# Patient Record
Sex: FEMALE | Race: WHITE | NOT HISPANIC OR LATINO | Employment: OTHER | URBAN - NONMETROPOLITAN AREA
[De-identification: names, ages, dates, MRNs, and addresses within clinical notes are randomized per-mention and may not be internally consistent; named-entity substitution may affect disease eponyms.]

---

## 2021-05-17 ENCOUNTER — IMMUNIZATION (OUTPATIENT)
Dept: FAMILY MEDICINE | Facility: CLINIC | Age: 71
End: 2021-05-17
Payer: MEDICARE

## 2021-05-17 DIAGNOSIS — Z23 NEED FOR VACCINATION: Primary | ICD-10-CM

## 2021-05-17 PROCEDURE — 91301 COVID-19, MRNA, LNP-S, PF, 100 MCG/0.5 ML DOSE VACCINE: CPT | Mod: ,,, | Performed by: FAMILY MEDICINE

## 2021-05-17 PROCEDURE — 99499 NO LOS: ICD-10-PCS | Mod: ,,, | Performed by: FAMILY MEDICINE

## 2021-05-17 PROCEDURE — 99499 UNLISTED E&M SERVICE: CPT | Mod: ,,, | Performed by: FAMILY MEDICINE

## 2021-05-17 PROCEDURE — 91301 COVID-19, MRNA, LNP-S, PF, 100 MCG/0.5 ML DOSE VACCINE: ICD-10-PCS | Mod: ,,, | Performed by: FAMILY MEDICINE

## 2021-05-17 PROCEDURE — 0012A COVID-19, MRNA, LNP-S, PF, 100 MCG/0.5 ML DOSE VACCINE: CPT | Mod: ,,, | Performed by: FAMILY MEDICINE

## 2021-05-17 PROCEDURE — 0012A COVID-19, MRNA, LNP-S, PF, 100 MCG/0.5 ML DOSE VACCINE: ICD-10-PCS | Mod: ,,, | Performed by: FAMILY MEDICINE

## 2021-10-18 ENCOUNTER — HOSPITAL ENCOUNTER (OUTPATIENT)
Dept: RADIOLOGY | Facility: HOSPITAL | Age: 71
Discharge: HOME | End: 2021-10-18
Attending: NURSE PRACTITIONER
Payer: MEDICARE

## 2021-10-18 DIAGNOSIS — R11.0 NAUSEA: ICD-10-CM

## 2021-10-18 DIAGNOSIS — R10.32 LLQ ABDOMINAL PAIN: ICD-10-CM

## 2021-10-18 DIAGNOSIS — R10.11 RUQ ABDOMINAL PAIN: ICD-10-CM

## 2021-10-18 LAB — CREAT SERPL-MCNC: 0.8 MG/DL (ref 0.6–1.3)

## 2021-10-18 PROCEDURE — 74177 CT ABDOMEN PELVIS WITH CONTRAST: ICD-10-PCS | Mod: 26,,, | Performed by: RADIOLOGY

## 2021-10-18 PROCEDURE — 82565 ASSAY OF CREATININE: CPT

## 2021-10-18 PROCEDURE — 74177 CT ABD & PELVIS W/CONTRAST: CPT | Mod: 26,,, | Performed by: RADIOLOGY

## 2021-10-18 PROCEDURE — 25500020 PHARM REV CODE 255

## 2021-10-18 PROCEDURE — 74177 CT ABD & PELVIS W/CONTRAST: CPT | Mod: TC

## 2021-10-18 RX ADMIN — IOPAMIDOL 100 ML: 755 INJECTION, SOLUTION INTRAVENOUS at 08:10

## 2022-01-10 ENCOUNTER — OFFICE VISIT (OUTPATIENT)
Dept: FAMILY MEDICINE | Facility: CLINIC | Age: 72
End: 2022-01-10
Payer: MEDICARE

## 2022-01-10 VITALS
RESPIRATION RATE: 20 BRPM | TEMPERATURE: 98 F | WEIGHT: 189 LBS | HEART RATE: 63 BPM | HEIGHT: 66 IN | DIASTOLIC BLOOD PRESSURE: 62 MMHG | OXYGEN SATURATION: 97 % | BODY MASS INDEX: 30.37 KG/M2 | SYSTOLIC BLOOD PRESSURE: 120 MMHG

## 2022-01-10 DIAGNOSIS — J06.9 UPPER RESPIRATORY TRACT INFECTION, UNSPECIFIED TYPE: Primary | ICD-10-CM

## 2022-01-10 PROCEDURE — 99212 OFFICE O/P EST SF 10 MIN: CPT | Mod: 25,,, | Performed by: NURSE PRACTITIONER

## 2022-01-10 PROCEDURE — 96372 THER/PROPH/DIAG INJ SC/IM: CPT | Mod: ,,, | Performed by: NURSE PRACTITIONER

## 2022-01-10 PROCEDURE — 99212 PR OFFICE/OUTPT VISIT, EST, LEVL II, 10-19 MIN: ICD-10-PCS | Mod: 25,,, | Performed by: NURSE PRACTITIONER

## 2022-01-10 PROCEDURE — 96372 PR INJECTION,THERAP/PROPH/DIAG2ST, IM OR SUBCUT: ICD-10-PCS | Mod: ,,, | Performed by: NURSE PRACTITIONER

## 2022-01-10 RX ORDER — PANTOPRAZOLE SODIUM 40 MG/1
1 TABLET, DELAYED RELEASE ORAL DAILY
COMMUNITY
Start: 2021-10-18 | End: 2023-02-07 | Stop reason: SDUPTHER

## 2022-01-10 RX ORDER — DOFETILIDE 0.25 MG/1
1 CAPSULE ORAL 2 TIMES DAILY
COMMUNITY
Start: 2021-12-10

## 2022-01-10 RX ORDER — EZETIMIBE 10 MG/1
1 TABLET ORAL DAILY
COMMUNITY
Start: 2021-10-14

## 2022-01-10 RX ORDER — METOPROLOL SUCCINATE 25 MG/1
1 TABLET, EXTENDED RELEASE ORAL DAILY
COMMUNITY
Start: 2021-10-14

## 2022-01-10 RX ORDER — RIVAROXABAN 20 MG/1
1 TABLET, FILM COATED ORAL DAILY
COMMUNITY
Start: 2021-11-03

## 2022-01-10 RX ORDER — AZITHROMYCIN 250 MG/1
TABLET, FILM COATED ORAL
Qty: 6 TABLET | Refills: 0 | Status: SHIPPED | OUTPATIENT
Start: 2022-01-10 | End: 2022-01-15

## 2022-01-10 RX ORDER — DEXAMETHASONE SODIUM PHOSPHATE 4 MG/ML
4 INJECTION, SOLUTION INTRA-ARTICULAR; INTRALESIONAL; INTRAMUSCULAR; INTRAVENOUS; SOFT TISSUE
Status: COMPLETED | OUTPATIENT
Start: 2022-01-10 | End: 2022-01-10

## 2022-01-10 RX ORDER — METHYLPREDNISOLONE ACETATE 40 MG/ML
40 INJECTION, SUSPENSION INTRA-ARTICULAR; INTRALESIONAL; INTRAMUSCULAR; SOFT TISSUE
Status: COMPLETED | OUTPATIENT
Start: 2022-01-10 | End: 2022-01-10

## 2022-01-10 RX ORDER — PAROXETINE HYDROCHLORIDE 20 MG/1
1 TABLET, FILM COATED ORAL DAILY
COMMUNITY
Start: 2021-10-15

## 2022-01-10 RX ORDER — CEFTRIAXONE 1 G/1
1 INJECTION, POWDER, FOR SOLUTION INTRAMUSCULAR; INTRAVENOUS
Status: COMPLETED | OUTPATIENT
Start: 2022-01-10 | End: 2022-01-10

## 2022-01-10 RX ADMIN — DEXAMETHASONE SODIUM PHOSPHATE 4 MG: 4 INJECTION, SOLUTION INTRA-ARTICULAR; INTRALESIONAL; INTRAMUSCULAR; INTRAVENOUS; SOFT TISSUE at 11:01

## 2022-01-10 RX ADMIN — CEFTRIAXONE 1 G: 1 INJECTION, POWDER, FOR SOLUTION INTRAMUSCULAR; INTRAVENOUS at 11:01

## 2022-01-10 RX ADMIN — METHYLPREDNISOLONE ACETATE 40 MG: 40 INJECTION, SUSPENSION INTRA-ARTICULAR; INTRALESIONAL; INTRAMUSCULAR; SOFT TISSUE at 11:01

## 2022-01-10 NOTE — PROGRESS NOTES
"New clinic note    Nataliya Zarco is a 71 y.o. female     Chief Complaint:   Chief Complaint   Patient presents with    Sinus Problem     Since Friday        Subjective:    Patient complains of sinus symptoms X 4 days. Admits to drainage and cough. Voice is hoarse today. Denies fever. Denies bodyaches.          Current Outpatient Medications:     dofetilide (TIKOSYN) 250 MCG Cap, Take 1 capsule by mouth 2 (two) times a day., Disp: , Rfl:     ezetimibe (ZETIA) 10 mg tablet, Take 1 tablet by mouth once daily., Disp: , Rfl:     metoprolol succinate (TOPROL-XL) 25 MG 24 hr tablet, Take 1 tablet by mouth once daily., Disp: , Rfl:     pantoprazole (PROTONIX) 40 MG tablet, Take 1 tablet by mouth once daily., Disp: , Rfl:     paroxetine (PAXIL) 20 MG tablet, Take 1 tablet by mouth once daily., Disp: , Rfl:     XARELTO 20 mg Tab, Take 1 tablet by mouth once daily., Disp: , Rfl:     azithromycin (Z-CHITRA) 250 MG tablet, Take 2 tablets by mouth on day 1; Take 1 tablet by mouth on days 2-5, Disp: 6 tablet, Rfl: 0    chlorpheniramine-phenyleph-DM 4-10-10 mg Tab, Take 1 tablet by mouth every 6 (six) hours as needed., Disp: 20 tablet, Rfl: 0  No current facility-administered medications for this visit.   Past Medical History:   Diagnosis Date    Atrial fibrillation     Hypertension           Review of Systems   Constitutional: Negative for fever.   HENT: Positive for nasal congestion, postnasal drip, rhinorrhea and sinus pressure/congestion. Negative for sore throat.    Respiratory: Positive for cough. Negative for shortness of breath.         Objective:    /62 (BP Location: Left arm, Patient Position: Sitting, BP Method: Large (Manual))   Pulse 63   Temp 97.8 °F (36.6 °C) (Oral)   Resp 20   Ht 5' 6" (1.676 m)   Wt 85.7 kg (189 lb)   SpO2 97%   BMI 30.51 kg/m²      Physical Exam  Constitutional:       Appearance: Normal appearance.   HENT:      Nose: Congestion present.      Mouth/Throat:      Comments: " Hoarse voice  Cardiovascular:      Rate and Rhythm: Normal rate and regular rhythm.      Pulses: Normal pulses.      Heart sounds: Normal heart sounds.   Pulmonary:      Effort: Pulmonary effort is normal.      Breath sounds: Normal breath sounds.   Neurological:      Mental Status: She is alert and oriented to person, place, and time.          Assessment and Plan:  1. Upper respiratory tract infection, unspecified type         Problem List Items Addressed This Visit    None     Visit Diagnoses     Upper respiratory tract infection, unspecified type    -  Primary    Relevant Medications    cefTRIAXone injection 1 g (Completed)    dexamethasone injection 4 mg (Completed)    methylPREDNISolone acetate injection 40 mg (Completed)    azithromycin (Z-CHITRA) 250 MG tablet    chlorpheniramine-phenyleph-DM 4-10-10 mg Tab         Uri--steroid and rocephin injection given today. rx zpak and ed a hist dm prn use.   There are no Patient Instructions on file for this visit.   Follow up if symptoms worsen or fail to improve.

## 2022-01-14 DIAGNOSIS — R05.9 COUGH: Primary | ICD-10-CM

## 2022-01-14 RX ORDER — BENZONATATE 200 MG/1
200 CAPSULE ORAL 3 TIMES DAILY PRN
Qty: 30 CAPSULE | Refills: 0 | Status: SHIPPED | OUTPATIENT
Start: 2022-01-14 | End: 2022-01-24

## 2022-02-04 ENCOUNTER — LAB VISIT (OUTPATIENT)
Dept: LAB | Facility: CLINIC | Age: 72
End: 2022-02-04
Payer: MEDICARE

## 2022-02-04 DIAGNOSIS — R25.2 MUSCLE CRAMPS: Primary | ICD-10-CM

## 2022-02-04 LAB
ANION GAP SERPL CALCULATED.3IONS-SCNC: 8 MMOL/L (ref 7–16)
BUN SERPL-MCNC: 16 MG/DL (ref 7–18)
BUN/CREAT SERPL: 21 (ref 6–20)
CALCIUM SERPL-MCNC: 9.1 MG/DL (ref 8.5–10.1)
CHLORIDE SERPL-SCNC: 104 MMOL/L (ref 98–107)
CO2 SERPL-SCNC: 30 MMOL/L (ref 21–32)
CREAT SERPL-MCNC: 0.76 MG/DL (ref 0.55–1.02)
GLUCOSE SERPL-MCNC: 76 MG/DL (ref 74–106)
MAGNESIUM SERPL-MCNC: 2.1 MG/DL (ref 1.7–2.3)
POTASSIUM SERPL-SCNC: 4.3 MMOL/L (ref 3.5–5.1)
SODIUM SERPL-SCNC: 138 MMOL/L (ref 136–145)

## 2022-02-04 PROCEDURE — 80048 BASIC METABOLIC PANEL: ICD-10-PCS | Mod: ,,, | Performed by: CLINICAL MEDICAL LABORATORY

## 2022-02-04 PROCEDURE — 83735 MAGNESIUM: ICD-10-PCS | Mod: ,,, | Performed by: CLINICAL MEDICAL LABORATORY

## 2022-02-04 PROCEDURE — 83735 ASSAY OF MAGNESIUM: CPT | Mod: ,,, | Performed by: CLINICAL MEDICAL LABORATORY

## 2022-02-04 PROCEDURE — 80048 BASIC METABOLIC PNL TOTAL CA: CPT | Mod: ,,, | Performed by: CLINICAL MEDICAL LABORATORY

## 2022-03-11 DIAGNOSIS — Z71.89 COMPLEX CARE COORDINATION: ICD-10-CM

## 2022-04-11 ENCOUNTER — LAB VISIT (OUTPATIENT)
Dept: LAB | Facility: CLINIC | Age: 72
End: 2022-04-11
Payer: MEDICARE

## 2022-04-11 DIAGNOSIS — I48.91 ATRIAL FIBRILLATION, UNSPECIFIED TYPE: ICD-10-CM

## 2022-04-11 DIAGNOSIS — I10 HYPERTENSION, UNSPECIFIED TYPE: Primary | ICD-10-CM

## 2022-04-11 DIAGNOSIS — E78.5 HYPERLIPIDEMIA, UNSPECIFIED HYPERLIPIDEMIA TYPE: ICD-10-CM

## 2022-04-11 LAB
ALBUMIN SERPL BCP-MCNC: 3.7 G/DL (ref 3.5–5)
ALBUMIN/GLOB SERPL: 1.1 {RATIO}
ALP SERPL-CCNC: 63 U/L (ref 55–142)
ALT SERPL W P-5'-P-CCNC: 21 U/L (ref 13–56)
ANION GAP SERPL CALCULATED.3IONS-SCNC: 10 MMOL/L (ref 7–16)
AST SERPL W P-5'-P-CCNC: 17 U/L (ref 15–37)
BASOPHILS # BLD AUTO: 0.06 K/UL (ref 0–0.2)
BASOPHILS NFR BLD AUTO: 0.9 % (ref 0–1)
BILIRUB SERPL-MCNC: 0.6 MG/DL (ref 0–1.2)
BUN SERPL-MCNC: 15 MG/DL (ref 7–18)
BUN/CREAT SERPL: 17 (ref 6–20)
CALCIUM SERPL-MCNC: 8.9 MG/DL (ref 8.5–10.1)
CHLORIDE SERPL-SCNC: 103 MMOL/L (ref 98–107)
CHOLEST SERPL-MCNC: 264 MG/DL (ref 0–200)
CHOLEST/HDLC SERPL: 4.4 {RATIO}
CO2 SERPL-SCNC: 32 MMOL/L (ref 21–32)
CREAT SERPL-MCNC: 0.86 MG/DL (ref 0.55–1.02)
DIFFERENTIAL METHOD BLD: ABNORMAL
EOSINOPHIL # BLD AUTO: 0.19 K/UL (ref 0–0.5)
EOSINOPHIL NFR BLD AUTO: 2.8 % (ref 1–4)
ERYTHROCYTE [DISTWIDTH] IN BLOOD BY AUTOMATED COUNT: 12.7 % (ref 11.5–14.5)
GLOBULIN SER-MCNC: 3.3 G/DL (ref 2–4)
GLUCOSE SERPL-MCNC: 73 MG/DL (ref 74–106)
HCT VFR BLD AUTO: 44.7 % (ref 38–47)
HDLC SERPL-MCNC: 60 MG/DL (ref 40–60)
HGB BLD-MCNC: 13.5 G/DL (ref 12–16)
IMM GRANULOCYTES # BLD AUTO: 0.03 K/UL (ref 0–0.04)
IMM GRANULOCYTES NFR BLD: 0.4 % (ref 0–0.4)
LDLC SERPL CALC-MCNC: 176 MG/DL
LDLC/HDLC SERPL: 2.9 {RATIO}
LYMPHOCYTES # BLD AUTO: 2.33 K/UL (ref 1–4.8)
LYMPHOCYTES NFR BLD AUTO: 34 % (ref 27–41)
MAGNESIUM SERPL-MCNC: 2 MG/DL (ref 1.7–2.3)
MCH RBC QN AUTO: 28 PG (ref 27–31)
MCHC RBC AUTO-ENTMCNC: 30.2 G/DL (ref 32–36)
MCV RBC AUTO: 92.5 FL (ref 80–96)
MONOCYTES # BLD AUTO: 0.69 K/UL (ref 0–0.8)
MONOCYTES NFR BLD AUTO: 10.1 % (ref 2–6)
MPC BLD CALC-MCNC: 10.5 FL (ref 9.4–12.4)
NEUTROPHILS # BLD AUTO: 3.55 K/UL (ref 1.8–7.7)
NEUTROPHILS NFR BLD AUTO: 51.8 % (ref 53–65)
NONHDLC SERPL-MCNC: 204 MG/DL
NRBC # BLD AUTO: 0 X10E3/UL
NRBC, AUTO (.00): 0 %
PLATELET # BLD AUTO: 282 K/UL (ref 150–400)
POTASSIUM SERPL-SCNC: 4.6 MMOL/L (ref 3.5–5.1)
PROT SERPL-MCNC: 7 G/DL (ref 6.4–8.2)
RBC # BLD AUTO: 4.83 M/UL (ref 4.2–5.4)
SODIUM SERPL-SCNC: 140 MMOL/L (ref 136–145)
TRIGL SERPL-MCNC: 139 MG/DL (ref 35–150)
VLDLC SERPL-MCNC: 28 MG/DL
WBC # BLD AUTO: 6.85 K/UL (ref 4.5–11)

## 2022-04-11 PROCEDURE — 80061 LIPID PANEL: CPT | Mod: ,,, | Performed by: CLINICAL MEDICAL LABORATORY

## 2022-04-11 PROCEDURE — 80053 COMPREHEN METABOLIC PANEL: CPT | Mod: ,,, | Performed by: CLINICAL MEDICAL LABORATORY

## 2022-04-11 PROCEDURE — 80053 COMPREHENSIVE METABOLIC PANEL: ICD-10-PCS | Mod: ,,, | Performed by: CLINICAL MEDICAL LABORATORY

## 2022-04-11 PROCEDURE — 85025 COMPLETE CBC W/AUTO DIFF WBC: CPT | Mod: ,,, | Performed by: CLINICAL MEDICAL LABORATORY

## 2022-04-11 PROCEDURE — 83735 MAGNESIUM: ICD-10-PCS | Mod: ,,, | Performed by: CLINICAL MEDICAL LABORATORY

## 2022-04-11 PROCEDURE — 85025 CBC WITH DIFFERENTIAL: ICD-10-PCS | Mod: ,,, | Performed by: CLINICAL MEDICAL LABORATORY

## 2022-04-11 PROCEDURE — 80061 LIPID PANEL: ICD-10-PCS | Mod: ,,, | Performed by: CLINICAL MEDICAL LABORATORY

## 2022-04-11 PROCEDURE — 83735 ASSAY OF MAGNESIUM: CPT | Mod: ,,, | Performed by: CLINICAL MEDICAL LABORATORY

## 2022-10-09 DIAGNOSIS — Z71.89 COMPLEX CARE COORDINATION: ICD-10-CM

## 2023-01-10 ENCOUNTER — CLINICAL SUPPORT (OUTPATIENT)
Dept: FAMILY MEDICINE | Facility: CLINIC | Age: 73
End: 2023-01-10
Payer: MEDICARE

## 2023-01-10 VITALS
BODY MASS INDEX: 30.37 KG/M2 | OXYGEN SATURATION: 99 % | SYSTOLIC BLOOD PRESSURE: 124 MMHG | RESPIRATION RATE: 20 BRPM | WEIGHT: 189 LBS | DIASTOLIC BLOOD PRESSURE: 70 MMHG | HEIGHT: 66 IN | TEMPERATURE: 97 F | HEART RATE: 68 BPM

## 2023-01-10 DIAGNOSIS — Z23 IMMUNIZATION DUE: Primary | ICD-10-CM

## 2023-01-10 PROCEDURE — 90694 FLU VACCINE - QUADRIVALENT - ADJUVANTED: ICD-10-PCS | Mod: ,,, | Performed by: FAMILY MEDICINE

## 2023-01-10 PROCEDURE — G0008 ADMIN INFLUENZA VIRUS VAC: HCPCS | Mod: ,,, | Performed by: FAMILY MEDICINE

## 2023-01-10 PROCEDURE — G0008 FLU VACCINE - QUADRIVALENT - ADJUVANTED: ICD-10-PCS | Mod: ,,, | Performed by: FAMILY MEDICINE

## 2023-01-10 PROCEDURE — 90694 VACC AIIV4 NO PRSRV 0.5ML IM: CPT | Mod: ,,, | Performed by: FAMILY MEDICINE

## 2023-02-07 ENCOUNTER — OFFICE VISIT (OUTPATIENT)
Dept: FAMILY MEDICINE | Facility: CLINIC | Age: 73
End: 2023-02-07
Payer: MEDICARE

## 2023-02-07 VITALS
OXYGEN SATURATION: 97 % | SYSTOLIC BLOOD PRESSURE: 120 MMHG | WEIGHT: 193.38 LBS | TEMPERATURE: 97 F | HEIGHT: 66 IN | DIASTOLIC BLOOD PRESSURE: 70 MMHG | HEART RATE: 51 BPM | RESPIRATION RATE: 18 BRPM | BODY MASS INDEX: 31.08 KG/M2

## 2023-02-07 DIAGNOSIS — J32.9 SINUSITIS, UNSPECIFIED CHRONICITY, UNSPECIFIED LOCATION: Primary | ICD-10-CM

## 2023-02-07 DIAGNOSIS — R09.81 STUFFY HEAD: ICD-10-CM

## 2023-02-07 DIAGNOSIS — K21.9 GASTROESOPHAGEAL REFLUX DISEASE, UNSPECIFIED WHETHER ESOPHAGITIS PRESENT: ICD-10-CM

## 2023-02-07 DIAGNOSIS — R07.0 PAIN IN THROAT: ICD-10-CM

## 2023-02-07 DIAGNOSIS — R52 BODY ACHES: ICD-10-CM

## 2023-02-07 DIAGNOSIS — H92.03 EAR PAIN, BILATERAL: ICD-10-CM

## 2023-02-07 LAB
CTP QC/QA: YES
FLUAV AG NPH QL: NEGATIVE
FLUBV AG NPH QL: NEGATIVE
S PYO RRNA THROAT QL PROBE: NEGATIVE
SARS-COV-2 AG RESP QL IA.RAPID: NEGATIVE

## 2023-02-07 PROCEDURE — 87426 SARSCOV CORONAVIRUS AG IA: CPT | Mod: RHCUB | Performed by: NURSE PRACTITIONER

## 2023-02-07 PROCEDURE — 87804 INFLUENZA ASSAY W/OPTIC: CPT | Mod: RHCUB | Performed by: NURSE PRACTITIONER

## 2023-02-07 PROCEDURE — 99213 PR OFFICE/OUTPT VISIT, EST, LEVL III, 20-29 MIN: ICD-10-PCS | Mod: ,,, | Performed by: NURSE PRACTITIONER

## 2023-02-07 PROCEDURE — 96372 THER/PROPH/DIAG INJ SC/IM: CPT | Mod: ,,, | Performed by: NURSE PRACTITIONER

## 2023-02-07 PROCEDURE — 96372 PR INJECTION,THERAP/PROPH/DIAG2ST, IM OR SUBCUT: ICD-10-PCS | Mod: ,,, | Performed by: NURSE PRACTITIONER

## 2023-02-07 PROCEDURE — 99213 OFFICE O/P EST LOW 20 MIN: CPT | Mod: ,,, | Performed by: NURSE PRACTITIONER

## 2023-02-07 PROCEDURE — 87880 STREP A ASSAY W/OPTIC: CPT | Mod: RHCUB | Performed by: NURSE PRACTITIONER

## 2023-02-07 RX ORDER — AMOXICILLIN AND CLAVULANATE POTASSIUM 875; 125 MG/1; MG/1
1 TABLET, FILM COATED ORAL 2 TIMES DAILY
Qty: 20 TABLET | Refills: 0 | Status: SHIPPED | OUTPATIENT
Start: 2023-02-07 | End: 2023-08-07

## 2023-02-07 RX ORDER — METHYLPREDNISOLONE ACETATE 40 MG/ML
40 INJECTION, SUSPENSION INTRA-ARTICULAR; INTRALESIONAL; INTRAMUSCULAR; SOFT TISSUE
Status: COMPLETED | OUTPATIENT
Start: 2023-02-07 | End: 2023-02-07

## 2023-02-07 RX ORDER — CEFTRIAXONE 1 G/1
1 INJECTION, POWDER, FOR SOLUTION INTRAMUSCULAR; INTRAVENOUS
Status: COMPLETED | OUTPATIENT
Start: 2023-02-07 | End: 2023-02-07

## 2023-02-07 RX ORDER — PANTOPRAZOLE SODIUM 40 MG/1
40 TABLET, DELAYED RELEASE ORAL DAILY
Qty: 90 TABLET | Refills: 1 | Status: SHIPPED | OUTPATIENT
Start: 2023-02-07

## 2023-02-07 RX ORDER — DEXAMETHASONE SODIUM PHOSPHATE 4 MG/ML
4 INJECTION, SOLUTION INTRA-ARTICULAR; INTRALESIONAL; INTRAMUSCULAR; INTRAVENOUS; SOFT TISSUE
Status: COMPLETED | OUTPATIENT
Start: 2023-02-07 | End: 2023-02-07

## 2023-02-07 RX ORDER — EVOLOCUMAB 140 MG/ML
140 INJECTION, SOLUTION SUBCUTANEOUS
COMMUNITY
Start: 2023-01-16

## 2023-02-07 RX ADMIN — DEXAMETHASONE SODIUM PHOSPHATE 4 MG: 4 INJECTION, SOLUTION INTRA-ARTICULAR; INTRALESIONAL; INTRAMUSCULAR; INTRAVENOUS; SOFT TISSUE at 02:02

## 2023-02-07 RX ADMIN — METHYLPREDNISOLONE ACETATE 40 MG: 40 INJECTION, SUSPENSION INTRA-ARTICULAR; INTRALESIONAL; INTRAMUSCULAR; SOFT TISSUE at 02:02

## 2023-02-07 RX ADMIN — CEFTRIAXONE 1 G: 1 INJECTION, POWDER, FOR SOLUTION INTRAMUSCULAR; INTRAVENOUS at 02:02

## 2023-02-07 NOTE — PROGRESS NOTES
New clinic note    Nataliya Zarco is a 72 y.o. female     Chief Complaint:   Chief Complaint   Patient presents with    Sore Throat    Generalized Body Aches    Otalgia        Subjective:    Patient complains of sore throat, body aches, and ear ache. Symptoms started suddenly yesterday evening. Denies fever. Denies drainage from ears. Admits to dry cough. Admits to nasal congestion.   Patient states also needs a refill on protonix.     Sore Throat   Associated symptoms include congestion, coughing and ear pain. Pertinent negatives include no ear discharge or shortness of breath.   Otalgia   Associated symptoms include coughing and a sore throat. Pertinent negatives include no ear discharge.      Allergies:   Review of patient's allergies indicates:   Allergen Reactions    Opioids - morphine analogues Shortness Of Breath    Lisinopril Other (See Comments)     cough    Codeine Nausea And Vomiting        Past Medical History:  Past Medical History:   Diagnosis Date    Atrial fibrillation     Hypertension         Current Medications:    Current Outpatient Medications:     dofetilide (TIKOSYN) 250 MCG Cap, Take 1 capsule by mouth 2 (two) times a day., Disp: , Rfl:     metoprolol succinate (TOPROL-XL) 25 MG 24 hr tablet, Take 1 tablet by mouth once daily., Disp: , Rfl:     paroxetine (PAXIL) 20 MG tablet, Take 1 tablet by mouth once daily., Disp: , Rfl:     REPATHA SURECLICK 140 mg/mL PnIj, Inject 140 mLs into the skin every 14 (fourteen) days., Disp: , Rfl:     XARELTO 20 mg Tab, Take 1 tablet by mouth once daily., Disp: , Rfl:     amoxicillin-clavulanate 875-125mg (AUGMENTIN) 875-125 mg per tablet, Take 1 tablet by mouth 2 (two) times daily., Disp: 20 tablet, Rfl: 0    chlorpheniramine-phenyleph-DM 4-10-10 mg Tab, Take 1 tablet by mouth every 6 (six) hours as needed., Disp: 20 tablet, Rfl: 0    ezetimibe (ZETIA) 10 mg tablet, Take 1 tablet by mouth once daily., Disp: , Rfl:     pantoprazole (PROTONIX) 40 MG tablet,  "Take 1 tablet (40 mg total) by mouth once daily., Disp: 90 tablet, Rfl: 1    porcine acell submucosa,meshed (OASIS BURN MATRIX MESHED TOP), Take 1 tablet by mouth Daily., Disp: , Rfl:   No current facility-administered medications for this visit.       Review of Systems   Constitutional:  Negative for fever.   HENT:  Positive for nasal congestion, ear pain, sinus pressure/congestion and sore throat. Negative for ear discharge.    Respiratory:  Positive for cough. Negative for shortness of breath.    Musculoskeletal:  Positive for myalgias.        Objective:    /70 (BP Location: Left arm, Patient Position: Sitting, BP Method: Large (Manual))   Pulse (!) 51   Temp 97.2 °F (36.2 °C) (Oral)   Resp 18   Ht 5' 6" (1.676 m)   Wt 87.7 kg (193 lb 6.4 oz)   SpO2 97%   BMI 31.22 kg/m²      Physical Exam  Constitutional:       Appearance: Normal appearance.   HENT:      Right Ear: Tympanic membrane normal.      Ears:      Comments: +fluid left tm     Nose: Congestion present. No rhinorrhea.      Mouth/Throat:      Pharynx: Posterior oropharyngeal erythema present. No oropharyngeal exudate.   Eyes:      Extraocular Movements: Extraocular movements intact.   Cardiovascular:      Rate and Rhythm: Normal rate. Rhythm irregular.      Pulses: Normal pulses.      Heart sounds: Normal heart sounds.   Pulmonary:      Effort: Pulmonary effort is normal.      Breath sounds: Normal breath sounds.   Musculoskeletal:      Cervical back: Neck supple.   Lymphadenopathy:      Cervical: Cervical adenopathy present.   Neurological:      Mental Status: She is alert and oriented to person, place, and time.        Assessment and Plan:    1. Sinusitis, unspecified chronicity, unspecified location    2. Pain in throat    3. Body aches    4. Stuffy head    5. Ear pain, bilateral    6. Gastroesophageal reflux disease, unspecified whether esophagitis present         Sinusitis, unspecified chronicity, unspecified location  -     dexAMETHasone " injection 4 mg  -     methylPREDNISolone acetate injection 40 mg  -     cefTRIAXone injection 1 g  -     chlorpheniramine-phenyleph-DM 4-10-10 mg Tab; Take 1 tablet by mouth every 6 (six) hours as needed.  Dispense: 20 tablet; Refill: 0  -     amoxicillin-clavulanate 875-125mg (AUGMENTIN) 875-125 mg per tablet; Take 1 tablet by mouth 2 (two) times daily.  Dispense: 20 tablet; Refill: 0    Pain in throat  -     SARS Coronavirus 2 Antigen, POCT  -     POCT rapid strep A  -     POCT Influenza A/B Rapid Antigen    Body aches  -     SARS Coronavirus 2 Antigen, POCT  -     POCT rapid strep A  -     POCT Influenza A/B Rapid Antigen    Stuffy head  -     SARS Coronavirus 2 Antigen, POCT  -     POCT rapid strep A  -     POCT Influenza A/B Rapid Antigen    Ear pain, bilateral  -     SARS Coronavirus 2 Antigen, POCT  -     POCT rapid strep A  -     POCT Influenza A/B Rapid Antigen    Gastroesophageal reflux disease, unspecified whether esophagitis present  -     pantoprazole (PROTONIX) 40 MG tablet; Take 1 tablet (40 mg total) by mouth once daily.  Dispense: 90 tablet; Refill: 1       Covid-  Flu -  Strep -    There are no Patient Instructions on file for this visit.   Follow up if symptoms worsen or fail to improve.

## 2023-05-09 DIAGNOSIS — Z71.89 COMPLEX CARE COORDINATION: ICD-10-CM

## 2023-08-07 ENCOUNTER — OFFICE VISIT (OUTPATIENT)
Dept: FAMILY MEDICINE | Facility: CLINIC | Age: 73
End: 2023-08-07
Payer: MEDICARE

## 2023-08-07 VITALS
HEIGHT: 66 IN | SYSTOLIC BLOOD PRESSURE: 124 MMHG | TEMPERATURE: 98 F | DIASTOLIC BLOOD PRESSURE: 75 MMHG | RESPIRATION RATE: 18 BRPM | HEART RATE: 55 BPM | WEIGHT: 192.19 LBS | BODY MASS INDEX: 30.89 KG/M2 | OXYGEN SATURATION: 95 %

## 2023-08-07 DIAGNOSIS — J06.9 UPPER RESPIRATORY TRACT INFECTION, UNSPECIFIED TYPE: Primary | ICD-10-CM

## 2023-08-07 DIAGNOSIS — R50.9 FEVER, UNSPECIFIED FEVER CAUSE: ICD-10-CM

## 2023-08-07 DIAGNOSIS — G44.209 TENSION-TYPE HEADACHE, NOT INTRACTABLE, UNSPECIFIED CHRONICITY PATTERN: ICD-10-CM

## 2023-08-07 DIAGNOSIS — R05.9 COUGH, UNSPECIFIED TYPE: ICD-10-CM

## 2023-08-07 PROCEDURE — 99213 PR OFFICE/OUTPT VISIT, EST, LEVL III, 20-29 MIN: ICD-10-PCS | Mod: ,,, | Performed by: NURSE PRACTITIONER

## 2023-08-07 PROCEDURE — 99213 OFFICE O/P EST LOW 20 MIN: CPT | Mod: ,,, | Performed by: NURSE PRACTITIONER

## 2023-08-07 PROCEDURE — 96372 PR INJECTION,THERAP/PROPH/DIAG2ST, IM OR SUBCUT: ICD-10-PCS | Mod: ,,, | Performed by: NURSE PRACTITIONER

## 2023-08-07 PROCEDURE — 96372 THER/PROPH/DIAG INJ SC/IM: CPT | Mod: ,,, | Performed by: NURSE PRACTITIONER

## 2023-08-07 RX ORDER — FUROSEMIDE 20 MG/1
1 TABLET ORAL DAILY PRN
COMMUNITY
Start: 2023-04-21

## 2023-08-07 RX ORDER — AMOXICILLIN AND CLAVULANATE POTASSIUM 875; 125 MG/1; MG/1
1 TABLET, FILM COATED ORAL 2 TIMES DAILY
Qty: 20 TABLET | Refills: 0 | Status: SHIPPED | OUTPATIENT
Start: 2023-08-07 | End: 2024-03-04

## 2023-08-07 RX ORDER — CEFTRIAXONE 1 G/1
1 INJECTION, POWDER, FOR SOLUTION INTRAMUSCULAR; INTRAVENOUS
Status: COMPLETED | OUTPATIENT
Start: 2023-08-07 | End: 2023-08-07

## 2023-08-07 RX ORDER — IBUPROFEN 100 MG/5ML
1 SUSPENSION, ORAL (FINAL DOSE FORM) ORAL DAILY
COMMUNITY

## 2023-08-07 RX ORDER — METHYLPREDNISOLONE ACETATE 40 MG/ML
40 INJECTION, SUSPENSION INTRA-ARTICULAR; INTRALESIONAL; INTRAMUSCULAR; SOFT TISSUE
Status: COMPLETED | OUTPATIENT
Start: 2023-08-07 | End: 2023-08-07

## 2023-08-07 RX ORDER — DEXAMETHASONE SODIUM PHOSPHATE 4 MG/ML
4 INJECTION, SOLUTION INTRA-ARTICULAR; INTRALESIONAL; INTRAMUSCULAR; INTRAVENOUS; SOFT TISSUE
Status: COMPLETED | OUTPATIENT
Start: 2023-08-07 | End: 2023-08-07

## 2023-08-07 RX ADMIN — CEFTRIAXONE 1 G: 1 INJECTION, POWDER, FOR SOLUTION INTRAMUSCULAR; INTRAVENOUS at 02:08

## 2023-08-07 RX ADMIN — METHYLPREDNISOLONE ACETATE 40 MG: 40 INJECTION, SUSPENSION INTRA-ARTICULAR; INTRALESIONAL; INTRAMUSCULAR; SOFT TISSUE at 02:08

## 2023-08-07 RX ADMIN — DEXAMETHASONE SODIUM PHOSPHATE 4 MG: 4 INJECTION, SOLUTION INTRA-ARTICULAR; INTRALESIONAL; INTRAMUSCULAR; INTRAVENOUS; SOFT TISSUE at 02:08

## 2023-08-07 NOTE — LETTER
August 7, 2023      [facility]  [address]  [place] [address]  Phone: [phone]  Fax: [phone]       Patient: Nataliya Zarco   YOB: 1950  Date of Visit: 08/07/2023    To Whom It May Concern:    Shannan Zarco  was at [facility] on 08/07/2023. The patient may return to work/school on 08/09/2023 with no restrictions. If you have any questions or concerns, or if I can be of further assistance, please do not hesitate to contact me.    Sincerely,    SHAWNA Garcia

## 2023-08-07 NOTE — PROGRESS NOTES
Clinic Note    Nataliya Zarco is a 73 y.o. female     Chief Complaint:   Chief Complaint   Patient presents with    Cough    Headache    Fever    Ear Drainage     All x 4 days         Subjective:    Patient complains of headache, ear pressure, and cough. Admits to low grade fever of 100. Symptoms X 1 week. Cough is nonproductive. Patient states sputum feels so thick she cannot get anything up. Denies nasal drainage. Denies sore throat.     Cough  Associated symptoms include a fever and headaches. Pertinent negatives include no chest pain, sore throat or shortness of breath.   Headache   Associated symptoms include coughing, a fever and sinus pressure. Pertinent negatives include no sore throat.   Fever   Associated symptoms include coughing and headaches. Pertinent negatives include no chest pain, congestion or sore throat.   Ear Drainage   Associated symptoms include coughing and headaches. Pertinent negatives include no sore throat.        Allergies:   Review of patient's allergies indicates:   Allergen Reactions    Opioids - morphine analogues Shortness Of Breath    Lisinopril Other (See Comments)     cough    Codeine Nausea And Vomiting        Past Medical History:  Past Medical History:   Diagnosis Date    Atrial fibrillation     Hypertension         Current Medications:    Current Outpatient Medications:     ascorbic acid, vitamin C, (VITAMIN C) 1000 MG tablet, Take 1 tablet by mouth once daily., Disp: , Rfl:     dofetilide (TIKOSYN) 250 MCG Cap, Take 1 capsule by mouth 2 (two) times a day., Disp: , Rfl:     furosemide (LASIX) 20 MG tablet, Take 1 tablet by mouth daily as needed., Disp: , Rfl:     metoprolol succinate (TOPROL-XL) 25 MG 24 hr tablet, Take 1 tablet by mouth once daily., Disp: , Rfl:     pantoprazole (PROTONIX) 40 MG tablet, Take 1 tablet (40 mg total) by mouth once daily., Disp: 90 tablet, Rfl: 1    paroxetine (PAXIL) 20 MG tablet, Take 1 tablet by mouth once daily., Disp: , Rfl:     REPATHA  "SURECLICK 140 mg/mL PnIj, Inject 140 mLs into the skin every 14 (fourteen) days., Disp: , Rfl:     XARELTO 20 mg Tab, Take 1 tablet by mouth once daily., Disp: , Rfl:     amoxicillin-clavulanate 875-125mg (AUGMENTIN) 875-125 mg per tablet, Take 1 tablet by mouth 2 (two) times daily., Disp: 20 tablet, Rfl: 0    chlorpheniramine-phenyleph-DM 4-10-10 mg Tab, Take 1 tablet by mouth every 6 (six) hours as needed., Disp: 30 tablet, Rfl: 0    ezetimibe (ZETIA) 10 mg tablet, Take 1 tablet by mouth once daily., Disp: , Rfl:     porcine acell submucosa,meshed (OASIS BURN MATRIX MESHED TOP), Take 1 tablet by mouth Daily., Disp: , Rfl:   No current facility-administered medications for this visit.       Review of Systems   Constitutional:  Positive for fever.   HENT:  Positive for sinus pressure/congestion. Negative for nasal congestion and sore throat.    Respiratory:  Positive for cough. Negative for shortness of breath.    Cardiovascular:  Negative for chest pain.   Neurological:  Positive for headaches.          Objective:    /75 (BP Location: Left arm, Patient Position: Sitting, BP Method: Large (Automatic))   Pulse (!) 55   Temp 98.4 °F (36.9 °C) (Oral)   Resp 18   Ht 5' 6" (1.676 m)   Wt 87.2 kg (192 lb 3.2 oz)   SpO2 95%   BMI 31.02 kg/m²      Physical Exam  Constitutional:       Appearance: She is obese.   HENT:      Nose: No congestion or rhinorrhea.      Right Sinus: Frontal sinus tenderness present. No maxillary sinus tenderness.      Left Sinus: Frontal sinus tenderness present. No maxillary sinus tenderness.      Mouth/Throat:      Pharynx: No oropharyngeal exudate or posterior oropharyngeal erythema.      Comments: +post nasal  Eyes:      Extraocular Movements: Extraocular movements intact.   Cardiovascular:      Rate and Rhythm: Normal rate and regular rhythm.      Pulses: Normal pulses.      Heart sounds: Normal heart sounds.   Pulmonary:      Effort: Pulmonary effort is normal.      Breath sounds: " Normal breath sounds.   Musculoskeletal:      Cervical back: Neck supple.   Lymphadenopathy:      Cervical: No cervical adenopathy.   Neurological:      Mental Status: She is alert and oriented to person, place, and time.          Assessment and Plan:    1. Upper respiratory tract infection, unspecified type    2. Fever, unspecified fever cause    3. Cough, unspecified type    4. Tension-type headache, not intractable, unspecified chronicity pattern         Upper respiratory tract infection, unspecified type  -     dexAMETHasone injection 4 mg  -     methylPREDNISolone acetate injection 40 mg  -     cefTRIAXone injection 1 g  -     chlorpheniramine-phenyleph-DM 4-10-10 mg Tab; Take 1 tablet by mouth every 6 (six) hours as needed.  Dispense: 30 tablet; Refill: 0  -     amoxicillin-clavulanate 875-125mg (AUGMENTIN) 875-125 mg per tablet; Take 1 tablet by mouth 2 (two) times daily.  Dispense: 20 tablet; Refill: 0    Fever, unspecified fever cause  -     SARS Coronavirus 2 Antigen, POCT    Cough, unspecified type  -     SARS Coronavirus 2 Antigen, POCT    Tension-type headache, not intractable, unspecified chronicity pattern      Results for orders placed or performed in visit on 02/07/23   SARS Coronavirus 2 Antigen, POCT   Result Value Ref Range    SARS Coronavirus 2 Antigen Negative Negative     Acceptable Yes        There are no Patient Instructions on file for this visit.   Follow up if symptoms worsen or fail to improve.

## 2023-12-09 DIAGNOSIS — Z71.89 COMPLEX CARE COORDINATION: ICD-10-CM

## 2024-03-04 ENCOUNTER — OFFICE VISIT (OUTPATIENT)
Dept: FAMILY MEDICINE | Facility: CLINIC | Age: 74
End: 2024-03-04
Payer: MEDICARE

## 2024-03-04 VITALS
OXYGEN SATURATION: 94 % | TEMPERATURE: 99 F | HEIGHT: 66 IN | RESPIRATION RATE: 18 BRPM | HEART RATE: 65 BPM | SYSTOLIC BLOOD PRESSURE: 117 MMHG | DIASTOLIC BLOOD PRESSURE: 79 MMHG | WEIGHT: 196.19 LBS | BODY MASS INDEX: 31.53 KG/M2

## 2024-03-04 DIAGNOSIS — R10.9 ABDOMINAL PAIN, UNSPECIFIED ABDOMINAL LOCATION: ICD-10-CM

## 2024-03-04 DIAGNOSIS — K52.9 GASTROENTERITIS: Primary | ICD-10-CM

## 2024-03-04 LAB
BACTERIA #/AREA URNS HPF: ABNORMAL /HPF
BILIRUB SERPL-MCNC: NORMAL MG/DL
BILIRUB UR QL STRIP: NEGATIVE
BLOOD URINE, POC: NORMAL
CLARITY UR: ABNORMAL
COLOR UR: YELLOW
COLOR, POC UA: NORMAL
GLUCOSE UR QL STRIP: NORMAL
GLUCOSE UR STRIP-MCNC: NORMAL MG/DL
KETONES UR QL STRIP: NORMAL
KETONES UR STRIP-SCNC: NEGATIVE MG/DL
LEUKOCYTE ESTERASE UR QL STRIP: ABNORMAL
LEUKOCYTE ESTERASE URINE, POC: NORMAL
MUCOUS, UA: ABNORMAL /LPF
NITRITE UR QL STRIP: POSITIVE
NITRITE, POC UA: NORMAL
PH UR STRIP: 8 PH UNITS
PH, POC UA: 8.5
PROT UR QL STRIP: 20
PROTEIN, POC: NORMAL
RBC # UR STRIP: ABNORMAL /UL
RBC #/AREA URNS HPF: 3 /HPF
SP GR UR STRIP: 1.02
SPECIFIC GRAVITY, POC UA: 1.02
SQUAMOUS #/AREA URNS LPF: ABNORMAL /HPF
UROBILINOGEN UR STRIP-ACNC: NORMAL MG/DL
UROBILINOGEN, POC UA: 1
WBC #/AREA URNS HPF: 17 /HPF

## 2024-03-04 PROCEDURE — 87086 URINE CULTURE/COLONY COUNT: CPT | Mod: ,,, | Performed by: CLINICAL MEDICAL LABORATORY

## 2024-03-04 PROCEDURE — 81001 URINALYSIS AUTO W/SCOPE: CPT | Mod: ,,, | Performed by: CLINICAL MEDICAL LABORATORY

## 2024-03-04 PROCEDURE — 81003 URINALYSIS AUTO W/O SCOPE: CPT | Mod: RHCUB | Performed by: NURSE PRACTITIONER

## 2024-03-04 PROCEDURE — 99213 OFFICE O/P EST LOW 20 MIN: CPT | Mod: ,,, | Performed by: NURSE PRACTITIONER

## 2024-03-04 PROCEDURE — 87186 SC STD MICRODIL/AGAR DIL: CPT | Mod: ,,, | Performed by: CLINICAL MEDICAL LABORATORY

## 2024-03-04 PROCEDURE — 87077 CULTURE AEROBIC IDENTIFY: CPT | Mod: ,,, | Performed by: CLINICAL MEDICAL LABORATORY

## 2024-03-04 NOTE — PROGRESS NOTES
Clinic Note    Nataliya Zarco is a 73 y.o. female     Chief Complaint:   Chief Complaint   Patient presents with    Emesis    Diarrhea    Leg Pain     Left leg with pain moving down leg        Subjective:    Patient complains of nausea, vomiting, diarrhea, and abdominal discomfort. Patient states she woke up this am around 1:30 with diarrhea. Patient states she belched and vomiting followed. Patient states she has had some llq abdominal discomfort that radiates around to left flank for a while. Denies dysuria, hematuria, or frequency.   Patient states she was scheduled for echo this afternoon but had to cancel due to symptoms.     Emesis   Associated symptoms include abdominal pain and diarrhea. Pertinent negatives include no chest pain, coughing or fever.   Diarrhea   Associated symptoms include abdominal pain and vomiting. Pertinent negatives include no coughing or fever.   Leg Pain          Allergies:   Review of patient's allergies indicates:   Allergen Reactions    Opioids - morphine analogues Shortness Of Breath    Lisinopril Other (See Comments)     cough    Codeine Nausea And Vomiting        Past Medical History:  Past Medical History:   Diagnosis Date    Atrial fibrillation     Hypertension         Current Medications:    Current Outpatient Medications:     ascorbic acid, vitamin C, (VITAMIN C) 1000 MG tablet, Take 1 tablet by mouth once daily., Disp: , Rfl:     chlorpheniramine-phenyleph-DM 4-10-10 mg Tab, Take 1 tablet by mouth every 6 (six) hours as needed., Disp: 30 tablet, Rfl: 0    dofetilide (TIKOSYN) 250 MCG Cap, Take 1 capsule by mouth 2 (two) times a day., Disp: , Rfl:     furosemide (LASIX) 20 MG tablet, Take 1 tablet by mouth daily as needed., Disp: , Rfl:     metoprolol succinate (TOPROL-XL) 25 MG 24 hr tablet, Take 1 tablet by mouth once daily., Disp: , Rfl:     pantoprazole (PROTONIX) 40 MG tablet, Take 1 tablet (40 mg total) by mouth once daily., Disp: 90 tablet, Rfl: 1    paroxetine  "(PAXIL) 20 MG tablet, Take 1 tablet by mouth once daily., Disp: , Rfl:     porcine acell submucosa,meshed (OASIS BURN MATRIX MESHED TOP), Take 1 tablet by mouth Daily., Disp: , Rfl:     REPATHA SURECLICK 140 mg/mL PnIj, Inject 140 mLs into the skin every 14 (fourteen) days., Disp: , Rfl:     XARELTO 20 mg Tab, Take 1 tablet by mouth once daily., Disp: , Rfl:     ezetimibe (ZETIA) 10 mg tablet, Take 1 tablet by mouth once daily., Disp: , Rfl:        Review of Systems   Constitutional:  Negative for fever.   Respiratory:  Negative for cough and shortness of breath.    Cardiovascular:  Negative for chest pain and palpitations.   Gastrointestinal:  Positive for abdominal pain, diarrhea, nausea and vomiting. Negative for constipation.   Genitourinary:  Positive for flank pain. Negative for dysuria.   Musculoskeletal:  Positive for leg pain.          Objective:    /79 (BP Location: Left arm, Patient Position: Sitting, BP Method: Large (Automatic))   Pulse 65   Temp 98.7 °F (37.1 °C) (Oral)   Resp 18   Ht 5' 6" (1.676 m)   Wt 89 kg (196 lb 3.2 oz)   SpO2 (!) 94%   BMI 31.67 kg/m²      Physical Exam  Constitutional:       General: She is not in acute distress.     Appearance: She is ill-appearing.   HENT:      Mouth/Throat:      Mouth: Mucous membranes are moist.   Eyes:      Extraocular Movements: Extraocular movements intact.   Cardiovascular:      Rate and Rhythm: Normal rate and regular rhythm.      Pulses: Normal pulses.      Heart sounds: Normal heart sounds.   Pulmonary:      Effort: Pulmonary effort is normal.      Breath sounds: Normal breath sounds.   Abdominal:      General: Bowel sounds are increased.      Palpations: Abdomen is soft.      Tenderness: There is no abdominal tenderness. There is left CVA tenderness. There is no guarding or rebound.   Neurological:      Mental Status: She is alert and oriented to person, place, and time.          Assessment and Plan:    1. Gastroenteritis    2. " Abdominal pain, unspecified abdominal location         Gastroenteritis  -bland diet and advance as tolerated  -small sips of fluids    Abdominal pain, unspecified abdominal location  -     POCT URINALYSIS W/O SCOPE  -     Urinalysis; Future; Expected date: 03/04/2024  -     Urine Culture High Risk; Future; Expected date: 03/04/2024          There are no Patient Instructions on file for this visit.   Follow up if symptoms worsen or fail to improve.

## 2024-03-06 LAB — UA COMPLETE W REFLEX CULTURE PNL UR: ABNORMAL

## 2024-03-06 RX ORDER — NITROFURANTOIN 25; 75 MG/1; MG/1
100 CAPSULE ORAL 2 TIMES DAILY
Qty: 14 CAPSULE | Refills: 0 | Status: SHIPPED | OUTPATIENT
Start: 2024-03-06

## 2024-07-09 DIAGNOSIS — Z71.89 COMPLEX CARE COORDINATION: ICD-10-CM

## 2024-11-19 ENCOUNTER — OFFICE VISIT (OUTPATIENT)
Dept: FAMILY MEDICINE | Facility: CLINIC | Age: 74
End: 2024-11-19
Payer: MEDICARE

## 2024-11-19 VITALS
HEART RATE: 113 BPM | OXYGEN SATURATION: 95 % | DIASTOLIC BLOOD PRESSURE: 72 MMHG | TEMPERATURE: 98 F | WEIGHT: 197 LBS | HEIGHT: 66 IN | BODY MASS INDEX: 31.66 KG/M2 | SYSTOLIC BLOOD PRESSURE: 116 MMHG

## 2024-11-19 DIAGNOSIS — R53.83 FATIGUE, UNSPECIFIED TYPE: ICD-10-CM

## 2024-11-19 DIAGNOSIS — E55.9 VITAMIN D DEFICIENCY: ICD-10-CM

## 2024-11-19 DIAGNOSIS — I48.0 PAROXYSMAL ATRIAL FIBRILLATION: Primary | ICD-10-CM

## 2024-11-19 LAB
EKG 12-LEAD: NORMAL
HEART RATE: 55
Lab: NORMAL
PR INTERVAL: NORMAL
PRT AXES: NORMAL
QRS DURATION: NORMAL
QT/QTC: NORMAL
VENTRICULAR RATE: NORMAL

## 2024-11-19 PROCEDURE — 93005 ELECTROCARDIOGRAM TRACING: CPT | Mod: RHCUB | Performed by: NURSE PRACTITIONER

## 2024-11-19 PROCEDURE — 85025 COMPLETE CBC W/AUTO DIFF WBC: CPT | Mod: ,,, | Performed by: CLINICAL MEDICAL LABORATORY

## 2024-11-19 PROCEDURE — 84443 ASSAY THYROID STIM HORMONE: CPT | Mod: ,,, | Performed by: CLINICAL MEDICAL LABORATORY

## 2024-11-19 PROCEDURE — 99213 OFFICE O/P EST LOW 20 MIN: CPT | Mod: ,,, | Performed by: NURSE PRACTITIONER

## 2024-11-19 PROCEDURE — 82306 VITAMIN D 25 HYDROXY: CPT | Mod: ,,, | Performed by: CLINICAL MEDICAL LABORATORY

## 2024-11-19 PROCEDURE — 80053 COMPREHEN METABOLIC PANEL: CPT | Mod: ,,, | Performed by: CLINICAL MEDICAL LABORATORY

## 2024-11-19 RX ORDER — LATANOPROST 50 UG/ML
SOLUTION/ DROPS OPHTHALMIC
COMMUNITY
Start: 2024-10-28

## 2024-11-19 NOTE — PROGRESS NOTES
Clinic Note    Nataliya Zarco is a 74 y.o. female     Chief Complaint:   Chief Complaint   Patient presents with    Abnormal ECG     Nataliya Zarco 74 y.o female present to clinic requesting ECG , pt stated she went to her EP doctor and EP doctor want to know if she is in Afib before her eye surgery.        Subjective:    Patient states she saw her EP yesterday. While at visit patient was noted to be in a.fib. Patient reports she was asymptomatic. Patient was instructed to follow up with pcp today for EKG. Patient is scheduled for procedure in 2 days for eyelids. Patient states she is suppose to hold xarelto tonight and tomorrow prior to procedure. Patient denies any palpitations, dizziness, chest pain, or shortness of breath.  Patient admits to fatigue at end of the day. States when she gets home from work she is tired and wants to rest. Patient admits to poor sleep. States gets up during the night. Admits to sleep apnea but states cannot tolerate cpap.          Allergies:   Review of patient's allergies indicates:   Allergen Reactions    Opioids - morphine analogues Shortness Of Breath    Lisinopril Other (See Comments)     cough    Codeine Nausea And Vomiting        Past Medical History:  Past Medical History:   Diagnosis Date    Atrial fibrillation     Hypertension         Current Medications:    Current Outpatient Medications:     dofetilide (TIKOSYN) 250 MCG Cap, Take 1 capsule by mouth 2 (two) times a day., Disp: , Rfl:     ezetimibe (ZETIA) 10 mg tablet, Take 1 tablet by mouth once daily., Disp: , Rfl:     furosemide (LASIX) 20 MG tablet, Take 1 tablet by mouth daily as needed., Disp: , Rfl:     latanoprost 0.005 % ophthalmic solution, Place into both eyes., Disp: , Rfl:     metoprolol succinate (TOPROL-XL) 25 MG 24 hr tablet, Take 1 tablet by mouth once daily., Disp: , Rfl:     pantoprazole (PROTONIX) 40 MG tablet, Take 1 tablet (40 mg total) by mouth once daily., Disp: 90 tablet, Rfl: 1    paroxetine  "(PAXIL) 20 MG tablet, Take 1 tablet by mouth once daily., Disp: , Rfl:     REPATHA SURECLICK 140 mg/mL PnIj, Inject 140 mLs into the skin every 14 (fourteen) days., Disp: , Rfl:     XARELTO 20 mg Tab, Take 1 tablet by mouth once daily., Disp: , Rfl:        Review of Systems   Constitutional:  Negative for fever.   Respiratory:  Negative for cough and shortness of breath.    Cardiovascular:  Negative for chest pain, palpitations and leg swelling.   Gastrointestinal:  Negative for abdominal pain, constipation, diarrhea, nausea and vomiting.   Genitourinary:  Negative for dysuria.   Neurological:  Negative for dizziness and headaches.          Objective:    /72 (BP Location: Right arm, Patient Position: Sitting)   Pulse (!) 113   Temp 98.1 °F (36.7 °C) (Oral)   Ht 5' 6" (1.676 m)   Wt 89.4 kg (197 lb)   SpO2 95%   BMI 31.80 kg/m²      Physical Exam  Constitutional:       Appearance: Normal appearance.   Eyes:      Extraocular Movements: Extraocular movements intact.   Cardiovascular:      Rate and Rhythm: Normal rate and regular rhythm.      Pulses: Normal pulses.      Heart sounds: Normal heart sounds.   Pulmonary:      Effort: Pulmonary effort is normal.      Breath sounds: Normal breath sounds.   Abdominal:      Palpations: Abdomen is soft.      Tenderness: There is no abdominal tenderness.   Neurological:      Mental Status: She is alert and oriented to person, place, and time.   Psychiatric:         Mood and Affect: Mood normal.          Assessment and Plan:    1. Paroxysmal atrial fibrillation    2. Fatigue, unspecified type    3. Vitamin D deficiency         Paroxysmal atrial fibrillation  -     CBC Auto Differential; Future; Expected date: 11/19/2024  -     Comprehensive Metabolic Panel; Future; Expected date: 11/19/2024  -     POCT EKG 12-LEAD (NOT FOR [facility] USE)    Fatigue, unspecified type  -     CBC Auto Differential; Future; Expected date: 11/19/2024  -     Comprehensive Metabolic Panel; " Future; Expected date: 11/19/2024  -     Vitamin D; Future; Expected date: 11/19/2024  -     TSH; Future; Expected date: 11/19/2024    Vitamin D deficiency  -     Vitamin D; Future; Expected date: 11/19/2024          There are no Patient Instructions on file for this visit.   Follow up if symptoms worsen or fail to improve.

## 2024-11-20 LAB
25(OH)D3 SERPL-MCNC: 16.9 NG/ML (ref 30–80)
ALBUMIN SERPL BCP-MCNC: 3.6 G/DL (ref 3.4–4.8)
ALBUMIN/GLOB SERPL: 1.3 {RATIO}
ALP SERPL-CCNC: 70 U/L (ref 40–150)
ALT SERPL W P-5'-P-CCNC: 12 U/L
ANION GAP SERPL CALCULATED.3IONS-SCNC: 11 MMOL/L (ref 7–16)
AST SERPL W P-5'-P-CCNC: 22 U/L (ref 5–34)
BASOPHILS # BLD AUTO: 0.05 K/UL (ref 0–0.2)
BASOPHILS NFR BLD AUTO: 0.7 % (ref 0–1)
BILIRUB SERPL-MCNC: 0.4 MG/DL
BUN SERPL-MCNC: 17 MG/DL (ref 10–20)
BUN/CREAT SERPL: 20 (ref 6–20)
CALCIUM SERPL-MCNC: 8.6 MG/DL (ref 8.4–10.2)
CHLORIDE SERPL-SCNC: 107 MMOL/L (ref 98–107)
CO2 SERPL-SCNC: 28 MMOL/L (ref 23–31)
CREAT SERPL-MCNC: 0.87 MG/DL (ref 0.55–1.02)
DIFFERENTIAL METHOD BLD: ABNORMAL
EGFR (NO RACE VARIABLE) (RUSH/TITUS): 70 ML/MIN/1.73M2
EOSINOPHIL # BLD AUTO: 0.11 K/UL (ref 0–0.5)
EOSINOPHIL NFR BLD AUTO: 1.5 % (ref 1–4)
ERYTHROCYTE [DISTWIDTH] IN BLOOD BY AUTOMATED COUNT: 13.1 % (ref 11.5–14.5)
GLOBULIN SER-MCNC: 2.8 G/DL (ref 2–4)
GLUCOSE SERPL-MCNC: 99 MG/DL (ref 82–115)
HCT VFR BLD AUTO: 37.1 % (ref 38–47)
HGB BLD-MCNC: 11.9 G/DL (ref 12–16)
IMM GRANULOCYTES # BLD AUTO: 0.03 K/UL (ref 0–0.04)
IMM GRANULOCYTES NFR BLD: 0.4 % (ref 0–0.4)
LYMPHOCYTES # BLD AUTO: 2.46 K/UL (ref 1–4.8)
LYMPHOCYTES NFR BLD AUTO: 33.2 % (ref 27–41)
MCH RBC QN AUTO: 27.8 PG (ref 27–31)
MCHC RBC AUTO-ENTMCNC: 32.1 G/DL (ref 32–36)
MCV RBC AUTO: 86.7 FL (ref 80–96)
MONOCYTES # BLD AUTO: 0.82 K/UL (ref 0–0.8)
MONOCYTES NFR BLD AUTO: 11.1 % (ref 2–6)
MPC BLD CALC-MCNC: 11.4 FL (ref 9.4–12.4)
NEUTROPHILS # BLD AUTO: 3.95 K/UL (ref 1.8–7.7)
NEUTROPHILS NFR BLD AUTO: 53.1 % (ref 53–65)
NRBC # BLD AUTO: 0 X10E3/UL
NRBC, AUTO (.00): 0 %
PLATELET # BLD AUTO: 242 K/UL (ref 150–400)
POTASSIUM SERPL-SCNC: 3.5 MMOL/L (ref 3.5–5.1)
PROT SERPL-MCNC: 6.4 G/DL (ref 5.8–7.6)
RBC # BLD AUTO: 4.28 M/UL (ref 4.2–5.4)
SODIUM SERPL-SCNC: 142 MMOL/L (ref 136–145)
TSH SERPL DL<=0.005 MIU/L-ACNC: 0.85 UIU/ML (ref 0.35–4.94)
WBC # BLD AUTO: 7.42 K/UL (ref 4.5–11)

## 2024-11-21 RX ORDER — ERGOCALCIFEROL 1.25 MG/1
50000 CAPSULE ORAL
Qty: 12 CAPSULE | Refills: 0 | Status: SHIPPED | OUTPATIENT
Start: 2024-11-21